# Patient Record
Sex: MALE | Race: WHITE | NOT HISPANIC OR LATINO | Employment: OTHER | URBAN - METROPOLITAN AREA
[De-identification: names, ages, dates, MRNs, and addresses within clinical notes are randomized per-mention and may not be internally consistent; named-entity substitution may affect disease eponyms.]

---

## 2023-10-07 ENCOUNTER — HOSPITAL ENCOUNTER (OUTPATIENT)
Dept: RADIOLOGY | Facility: HOSPITAL | Age: 53
Discharge: HOME | End: 2023-10-07
Payer: COMMERCIAL

## 2023-10-07 DIAGNOSIS — R29.898 OTHER SYMPTOMS AND SIGNS INVOLVING THE MUSCULOSKELETAL SYSTEM: ICD-10-CM

## 2023-10-07 DIAGNOSIS — M54.16 RADICULOPATHY, LUMBAR REGION: ICD-10-CM

## 2023-10-07 PROCEDURE — 72131 CT LUMBAR SPINE W/O DYE: CPT | Performed by: RADIOLOGY

## 2023-10-07 PROCEDURE — 72131 CT LUMBAR SPINE W/O DYE: CPT

## 2023-10-09 ENCOUNTER — TELEPHONE (OUTPATIENT)
Dept: NEUROSURGERY | Facility: CLINIC | Age: 53
End: 2023-10-09
Payer: COMMERCIAL

## 2023-11-06 PROBLEM — M51.26 HNP (HERNIATED NUCLEUS PULPOSUS), LUMBAR: Status: ACTIVE | Noted: 2023-11-06

## 2023-11-06 PROBLEM — R20.0 NUMBNESS: Status: ACTIVE | Noted: 2023-11-06

## 2023-11-06 PROBLEM — G54.1 LUMBOSACRAL PLEXUS LESION: Status: ACTIVE | Noted: 2021-07-01

## 2023-11-06 PROBLEM — R29.898 WEAKNESS OF RIGHT LOWER EXTREMITY: Status: ACTIVE | Noted: 2023-11-06

## 2023-11-06 RX ORDER — PREGABALIN 100 MG/1
100 CAPSULE ORAL 2 TIMES DAILY
COMMUNITY
Start: 2022-03-01

## 2023-11-06 RX ORDER — LANOLIN ALCOHOL/MO/W.PET/CERES
100 CREAM (GRAM) TOPICAL DAILY
COMMUNITY
Start: 2020-04-01

## 2023-11-06 RX ORDER — GABAPENTIN 100 MG/1
100 CAPSULE ORAL EVERY 8 HOURS
COMMUNITY
Start: 2023-05-10

## 2023-11-06 RX ORDER — MULTIVITAMIN
1 TABLET ORAL
COMMUNITY

## 2023-11-06 RX ORDER — FOLIC ACID 1 MG/1
1 TABLET ORAL DAILY
COMMUNITY
Start: 2020-04-01

## 2023-11-07 ENCOUNTER — OFFICE VISIT (OUTPATIENT)
Dept: NEUROSURGERY | Facility: CLINIC | Age: 53
End: 2023-11-07
Payer: COMMERCIAL

## 2023-11-07 VITALS
TEMPERATURE: 97.1 F | HEIGHT: 71 IN | HEART RATE: 77 BPM | WEIGHT: 185 LBS | SYSTOLIC BLOOD PRESSURE: 116 MMHG | BODY MASS INDEX: 25.9 KG/M2 | DIASTOLIC BLOOD PRESSURE: 76 MMHG

## 2023-11-07 DIAGNOSIS — M54.17 LUMBOSACRAL RADICULOPATHY: Primary | ICD-10-CM

## 2023-11-07 DIAGNOSIS — M47.816 LUMBAR SPONDYLOSIS: ICD-10-CM

## 2023-11-07 PROCEDURE — 99212 OFFICE O/P EST SF 10 MIN: CPT | Performed by: NEUROLOGICAL SURGERY

## 2023-11-07 RX ORDER — FLUTICASONE FUROATE 27.5 UG/1
2 SPRAY, METERED NASAL
COMMUNITY

## 2023-11-07 RX ORDER — FEXOFENADINE HCL 60 MG
30 TABLET ORAL 2 TIMES DAILY
COMMUNITY

## 2023-11-07 ASSESSMENT — PATIENT HEALTH QUESTIONNAIRE - PHQ9
SUM OF ALL RESPONSES TO PHQ9 QUESTIONS 1 & 2: 2
1. LITTLE INTEREST OR PLEASURE IN DOING THINGS: SEVERAL DAYS
2. FEELING DOWN, DEPRESSED OR HOPELESS: SEVERAL DAYS
10. IF YOU CHECKED OFF ANY PROBLEMS, HOW DIFFICULT HAVE THESE PROBLEMS MADE IT FOR YOU TO DO YOUR WORK, TAKE CARE OF THINGS AT HOME, OR GET ALONG WITH OTHER PEOPLE: VERY DIFFICULT

## 2023-11-07 ASSESSMENT — PAIN SCALES - GENERAL: PAINLEVEL: 8

## 2023-11-07 NOTE — PROGRESS NOTES
[facility] [place]  Department of Neurological Surgery  Follow-up Patient Visit    History of Present Illness:  Kyle Kohler II is a 53 y.o. year old male who presents to the spine clinic with right leg radiculopathy.     Patient reports a longstanding history of low back pain and numbness/paresthesias of the right lower extremity in an S1 distribution. Was seen by Dr. Kan initially and found to have an L5-S1 disc herniation causing lateral recess stenosis, s/p R L5-S1 microdiscectomy (2015). Symptoms resolved for 6 months then recurred, since that time has been seeing pain management for RITA with initial good response, now reduced to less than a week. Presenting to clinic today for second opinion regarding surgical correction.    Review of systems negative except as noted above.    Patient Active Problem List   Diagnosis    Alcohol abuse    Chronic rhinitis    Lumbosacral radiculopathy    Radiculopathy, lumbosacral region    Lumbosacral plexus lesion    HNP (herniated nucleus pulposus), lumbar    Numbness    Weakness of right lower extremity     History reviewed. No pertinent past medical history.  History reviewed. No pertinent surgical history.  Social History     Tobacco Use    Smoking status: Never    Smokeless tobacco: Not on file   Substance Use Topics    Alcohol use: Not Currently     family history is not on file.    Current Outpatient Medications:     ascorbic acid (Vitamin C) 100 mg tablet, Take 1 tablet (100 mg) by mouth once daily., Disp: , Rfl:     fexofenadine (Allegra) 60 mg tablet, Take 0.5 tablets (30 mg) by mouth 2 times a day., Disp: , Rfl:     fluticasone (Flonase Sensimist) 27.5 mcg/actuation nasal spray, Administer 2 sprays into each nostril once daily., Disp: , Rfl:     multivitamin tablet, Take 1 tablet by mouth once daily., Disp: , Rfl:     thiamine 100 mg tablet, Take 1 tablet (100 mg) by mouth once daily., Disp: , Rfl:     cetirizine (ZyrTEC) 10 mg capsule, Take 1  capsule (10 mg) by mouth 1 time., Disp: , Rfl:     folic acid (Folvite) 1 mg tablet, Take 1 tablet (1 mg) by mouth once daily., Disp: , Rfl:     gabapentin (Neurontin) 100 mg capsule, Take 1 capsule (100 mg) by mouth every 8 hours., Disp: , Rfl:     pregabalin (Lyrica) 100 mg capsule, Take 1 capsule (100 mg) by mouth 2 times a day., Disp: , Rfl:   No Known Allergies    Physical Examination    Awake, Ox3  BUE D/B/T/HG/IO 5  BLE HF/KE/DF/PF 5  RLE diminished sensation to light touch    Results    I personally reviewed and interpreted the imaging results which included MRI lumbar spine, demonstrating rightward L5-S1 disc herniation causing local contact with the S1 nerve root, approximately stable from MRI 2020. CT lumbar spine with right side disc-osteophyte complex at L5-S1 causing lateral recess stenosis. Flexion/extension xrays without abnormal motion.    Assessment and Plan:    Kyle Kohler II is a 53 y.o. year old male who presents to the spine clinic with chronic progressive low back pain and right S1 radiculopathy.  His symptoms have been refractory to conservative management, including physical therapy and multiple epidural steroid injections.  His imaging shows loss of disc height and a disc osteophyte complex at L5-S1 resulting in severe right lateral recess stenosis.  He also has significant bony foraminal stenosis at this level.  I discussed multiple treatment options with the patient, including further conservative care as well as open surgical approaches.  At this time, I am recommending a minimally invasive robot-assisted right L5-S1 TLIF.  I went over the risks associate with surgery, including infection, bleeding, neurologic injury, spinal fluid leak, and the need for further procedures.  All of the patient's questions were answered and he has elected to proceed with surgery.

## 2023-12-26 DIAGNOSIS — M54.17 LUMBOSACRAL RADICULOPATHY: ICD-10-CM

## 2024-03-04 ENCOUNTER — OUTSIDE PROCEDURE (OUTPATIENT)
Dept: NEUROSURGERY | Facility: HOSPITAL | Age: 54
End: 2024-03-04
Payer: COMMERCIAL

## 2024-03-04 DIAGNOSIS — M54.16 RIGHT LUMBAR RADICULOPATHY: Primary | ICD-10-CM

## 2024-03-04 PROCEDURE — 61783 SCAN PROC SPINAL: CPT | Performed by: NEUROLOGICAL SURGERY

## 2024-03-04 PROCEDURE — 22840 INSERT SPINE FIXATION DEVICE: CPT | Performed by: NEUROLOGICAL SURGERY

## 2024-03-04 PROCEDURE — 22633 ARTHRD CMBN 1NTRSPC LUMBAR: CPT | Performed by: NEUROLOGICAL SURGERY

## 2024-03-04 PROCEDURE — 22853 INSJ BIOMECHANICAL DEVICE: CPT | Performed by: NEUROLOGICAL SURGERY

## 2024-03-04 PROCEDURE — 63052 LAM FACETC/FRMT ARTHRD LUM 1: CPT | Performed by: NEUROLOGICAL SURGERY

## 2024-03-04 PROCEDURE — 20936 SP BONE AGRFT LOCAL ADD-ON: CPT | Performed by: NEUROLOGICAL SURGERY

## 2024-03-04 PROCEDURE — 20930 SP BONE ALGRFT MORSEL ADD-ON: CPT | Performed by: NEUROLOGICAL SURGERY

## 2024-03-06 ENCOUNTER — TELEPHONE (OUTPATIENT)
Dept: NEUROSURGERY | Facility: CLINIC | Age: 54
End: 2024-03-06
Payer: COMMERCIAL

## 2024-03-06 DIAGNOSIS — Z98.1 S/P LUMBAR SPINAL FUSION: Primary | ICD-10-CM

## 2024-03-06 RX ORDER — CYCLOBENZAPRINE HCL 10 MG
10 TABLET ORAL EVERY 8 HOURS PRN
Qty: 30 TABLET | Refills: 1 | Status: SHIPPED | OUTPATIENT
Start: 2024-03-06 | End: 2024-03-26 | Stop reason: SDUPTHER

## 2024-03-12 ENCOUNTER — TELEPHONE (OUTPATIENT)
Dept: NEUROSURGERY | Facility: CLINIC | Age: 54
End: 2024-03-12
Payer: COMMERCIAL

## 2024-03-12 DIAGNOSIS — Z98.1 S/P LUMBAR SPINAL FUSION: Primary | ICD-10-CM

## 2024-03-12 RX ORDER — OXYCODONE HYDROCHLORIDE 5 MG/1
5 TABLET ORAL EVERY 6 HOURS PRN
Qty: 28 TABLET | Refills: 0 | Status: SHIPPED | OUTPATIENT
Start: 2024-03-12 | End: 2024-03-13 | Stop reason: SDUPTHER

## 2024-03-13 DIAGNOSIS — G89.18 ACUTE POST-OPERATIVE PAIN: Primary | ICD-10-CM

## 2024-03-13 DIAGNOSIS — Z98.1 S/P LUMBAR SPINAL FUSION: ICD-10-CM

## 2024-03-13 RX ORDER — OXYCODONE HYDROCHLORIDE 5 MG/1
5 TABLET ORAL EVERY 6 HOURS PRN
Qty: 28 TABLET | Refills: 0 | Status: SHIPPED | OUTPATIENT
Start: 2024-03-13 | End: 2024-03-20 | Stop reason: SDUPTHER

## 2024-03-13 NOTE — PROGRESS NOTES
I have personally reviewed the OARRS report. No suspicious activity noted. This report is scanned into the electronic medical record. I have considered the risks of abuse, dependence, addiction and diversion.     Patient with recent history of major reconstructive surgery (L4 - 5 TLIF on 03/04/2024, by Dr. Lauri Mc at [facility]) necessitating narcotic medications at higher doses during the post-operative period of 6 weeks.     Will continue with close monitoring and short course refills.   Alyssa Alberto, APRN-CNP

## 2024-03-19 NOTE — PROGRESS NOTES
"Kyle Kohler II is a 53 y.o. male who is here for his 1st post op visit. He [place] L5 - S1 TL[place] [facility]. He was discharged home on 03/05/2024.    Since discharge from the hospital, he feels he's improved, but now has pain with sitting. He is tolerating pain medications. Activity level  - he is able to complete ADLs without assistance     Smoker: No  Anticoagulation: No    /62 (BP Location: Right arm, Patient Position: Sitting, BP Cuff Size: Adult)   Temp 36.4 °C (97.5 °F) (Temporal)   Ht 1.803 m (5' 11\")   Wt 83.9 kg (185 lb)   BMI 25.80 kg/m²     On exam:  A&O x 3, speech clear / fluent  Respirations even / unlabored  NAPIER  SURGICAL INCISIONS are: well approximated, no erythema / swelling / drainage  Gait is steady    Kyle Kohler II is progressing well, albeit slowly, post operatively. He agrees to follow up with Dr. Mc as previously scheduled, 05/28/2024, with lumbar spine x-rays prior to visit.  Alyssa Alberto, APRN-CNP      "

## 2024-03-20 ENCOUNTER — OFFICE VISIT (OUTPATIENT)
Dept: NEUROSURGERY | Facility: CLINIC | Age: 54
End: 2024-03-20
Payer: COMMERCIAL

## 2024-03-20 VITALS
TEMPERATURE: 97.5 F | DIASTOLIC BLOOD PRESSURE: 62 MMHG | WEIGHT: 185 LBS | BODY MASS INDEX: 25.9 KG/M2 | SYSTOLIC BLOOD PRESSURE: 110 MMHG | HEIGHT: 71 IN

## 2024-03-20 DIAGNOSIS — Z98.1 S/P LUMBAR FUSION: Primary | ICD-10-CM

## 2024-03-20 DIAGNOSIS — G89.18 ACUTE POST-OPERATIVE PAIN: ICD-10-CM

## 2024-03-20 DIAGNOSIS — Z98.1 S/P LUMBAR SPINAL FUSION: ICD-10-CM

## 2024-03-20 PROCEDURE — 99024 POSTOP FOLLOW-UP VISIT: CPT | Performed by: NURSE PRACTITIONER

## 2024-03-20 RX ORDER — OXYCODONE HYDROCHLORIDE 5 MG/1
5 TABLET ORAL EVERY 6 HOURS PRN
Qty: 28 TABLET | Refills: 0 | Status: SHIPPED | OUTPATIENT
Start: 2024-03-20 | End: 2024-03-26 | Stop reason: SDUPTHER

## 2024-03-20 ASSESSMENT — PATIENT HEALTH QUESTIONNAIRE - PHQ9
10. IF YOU CHECKED OFF ANY PROBLEMS, HOW DIFFICULT HAVE THESE PROBLEMS MADE IT FOR YOU TO DO YOUR WORK, TAKE CARE OF THINGS AT HOME, OR GET ALONG WITH OTHER PEOPLE: NOT DIFFICULT AT ALL
SUM OF ALL RESPONSES TO PHQ9 QUESTIONS 1 AND 2: 1
2. FEELING DOWN, DEPRESSED OR HOPELESS: NOT AT ALL
1. LITTLE INTEREST OR PLEASURE IN DOING THINGS: SEVERAL DAYS

## 2024-03-20 ASSESSMENT — ENCOUNTER SYMPTOMS: OCCASIONAL FEELINGS OF UNSTEADINESS: 0

## 2024-03-20 ASSESSMENT — PAIN SCALES - GENERAL: PAINLEVEL: 7

## 2024-03-26 ENCOUNTER — TELEPHONE (OUTPATIENT)
Dept: NEUROSURGERY | Facility: CLINIC | Age: 54
End: 2024-03-26
Payer: COMMERCIAL

## 2024-03-26 DIAGNOSIS — G89.18 ACUTE POST-OPERATIVE PAIN: ICD-10-CM

## 2024-03-26 DIAGNOSIS — Z98.1 S/P LUMBAR SPINAL FUSION: ICD-10-CM

## 2024-03-26 RX ORDER — OXYCODONE HYDROCHLORIDE 5 MG/1
5 TABLET ORAL EVERY 6 HOURS PRN
Qty: 28 TABLET | Refills: 0 | Status: SHIPPED | OUTPATIENT
Start: 2024-03-26 | End: 2024-04-02 | Stop reason: SDUPTHER

## 2024-03-26 RX ORDER — CYCLOBENZAPRINE HCL 10 MG
10 TABLET ORAL EVERY 8 HOURS PRN
Qty: 30 TABLET | Refills: 1 | Status: SHIPPED | OUTPATIENT
Start: 2024-03-26 | End: 2024-05-25

## 2024-03-26 NOTE — PROGRESS NOTES
I have personally reviewed the OARRS report. No suspicious activity noted. This report is scanned into the electronic medical record. I have considered the risks of abuse, dependence, addiction and diversion.     Patient with recent history of major reconstructive surgery ( L5 - S1 TLIF on 03/04/2024, at [facility] by Dr. Lauri Mc) necessitating narcotic medications at higher doses during the post-operative period of 6 weeks.     Will continue with close monitoring and short course refills.   Alyssa Alberto, APRN-CNP

## 2024-04-02 ENCOUNTER — TELEPHONE (OUTPATIENT)
Dept: NEUROSURGERY | Facility: CLINIC | Age: 54
End: 2024-04-02
Payer: COMMERCIAL

## 2024-04-02 DIAGNOSIS — G89.18 ACUTE POST-OPERATIVE PAIN: ICD-10-CM

## 2024-04-02 DIAGNOSIS — Z98.1 S/P LUMBAR SPINAL FUSION: ICD-10-CM

## 2024-04-02 RX ORDER — OXYCODONE HYDROCHLORIDE 5 MG/1
5 TABLET ORAL EVERY 6 HOURS PRN
Qty: 28 TABLET | Refills: 0 | Status: SHIPPED | OUTPATIENT
Start: 2024-04-02 | End: 2024-04-10 | Stop reason: SDUPTHER

## 2024-04-02 NOTE — PROGRESS NOTES
I have personally reviewed the OARRS report. This report is scanned into the electronic medical record. I have considered the risks of abuse, dependence, addiction and diversion.     Patient with recent history of major reconstructive surgery (L5 - S1 TLIF on [place]04[place]24, [place] [facility] [place]) necessitating narcotic medications at higher doses during the post-operative period of 6 weeks.     Will continue with close monitoring and short course refills.   Alyssa Alberto, APRN-CNP\

## 2024-04-09 ENCOUNTER — TELEPHONE (OUTPATIENT)
Dept: NEUROSURGERY | Facility: CLINIC | Age: 54
End: 2024-04-09
Payer: COMMERCIAL

## 2024-04-10 DIAGNOSIS — G89.18 ACUTE POST-OPERATIVE PAIN: ICD-10-CM

## 2024-04-10 DIAGNOSIS — Z98.1 S/P LUMBAR SPINAL FUSION: ICD-10-CM

## 2024-04-10 RX ORDER — OXYCODONE HYDROCHLORIDE 5 MG/1
5 TABLET ORAL EVERY 6 HOURS PRN
Qty: 28 TABLET | Refills: 0 | Status: SHIPPED | OUTPATIENT
Start: 2024-04-10 | End: 2024-04-17

## 2024-04-10 NOTE — PROGRESS NOTES
I have personally reviewed the OARRS report. No suspicious activity noted. This report is scanned into the electronic medical record. I have considered the risks of abuse, dependence, addiction and diversion.     Patient with recent history of major reconstructive surgery (L5 - S1 TLIF on 03/04/2024, at [facility] [place] by Dr. Lauri Mc) necessitating narcotic medications at higher doses during the post-operative period of 6 weeks.     Will continue with close monitoring. This is his final refill post operatively  Alyssa Alberto, APRN-CNP  .

## 2024-05-28 ENCOUNTER — OFFICE VISIT (OUTPATIENT)
Dept: NEUROSURGERY | Facility: CLINIC | Age: 54
End: 2024-05-28
Payer: COMMERCIAL

## 2024-05-28 VITALS
HEART RATE: 74 BPM | DIASTOLIC BLOOD PRESSURE: 70 MMHG | WEIGHT: 180 LBS | TEMPERATURE: 97.1 F | SYSTOLIC BLOOD PRESSURE: 110 MMHG | HEIGHT: 71 IN | BODY MASS INDEX: 25.2 KG/M2

## 2024-05-28 DIAGNOSIS — Z98.1 S/P LUMBAR FUSION: Primary | ICD-10-CM

## 2024-05-28 PROCEDURE — 99024 POSTOP FOLLOW-UP VISIT: CPT | Performed by: NEUROLOGICAL SURGERY

## 2024-05-28 RX ORDER — LORATADINE 10 MG/1
10 TABLET ORAL DAILY
COMMUNITY

## 2024-05-28 ASSESSMENT — PATIENT HEALTH QUESTIONNAIRE - PHQ9
SUM OF ALL RESPONSES TO PHQ9 QUESTIONS 1 & 2: 0
2. FEELING DOWN, DEPRESSED OR HOPELESS: NOT AT ALL
1. LITTLE INTEREST OR PLEASURE IN DOING THINGS: NOT AT ALL

## 2024-05-28 ASSESSMENT — PAIN SCALES - GENERAL: PAINLEVEL: 2

## 2024-05-28 NOTE — PROGRESS NOTES
[facility] [place]  Department of Neurological Surgery  Post Operative Patient Visit      History of Present Illness:  Kyle Kohler II is a 54 y.o. year old male who presents to the spine clinic for a post operative visit. They are status post L5-S1 TLIF on March 4, 2024.  He states that his preoperative right lower extremity pain has completely resolved.  He does have some tingling in his toes bilaterally, but states that this is getting better.  He also has some expected low back stiffness and discomfort, but states that this is also improving since his surgery.    14/14 systems reviewed and negative other than what is listed in the history of present illness    Patient Active Problem List   Diagnosis    Alcohol abuse    Chronic rhinitis    Lumbosacral radiculopathy    Radiculopathy, lumbosacral region    Lumbosacral plexus lesion    HNP (herniated nucleus pulposus), lumbar    Numbness    Weakness of right lower extremity     No past medical history on file.  No past surgical history on file.  Social History     Tobacco Use    Smoking status: Never    Smokeless tobacco: Not on file   Substance Use Topics    Alcohol use: Yes     Comment: Rarely     family history is not on file.    Current Outpatient Medications:     loratadine (Claritin) 10 mg tablet, Take 1 tablet (10 mg) by mouth once daily., Disp: , Rfl:     multivitamin tablet, Take 1 tablet by mouth once daily., Disp: , Rfl:     ascorbic acid (Vitamin C) 100 mg tablet, Take 1 tablet (100 mg) by mouth once daily., Disp: , Rfl:     cetirizine (ZyrTEC) 10 mg capsule, Take 1 capsule (10 mg) by mouth 1 time., Disp: , Rfl:     cyclobenzaprine (Flexeril) 10 mg tablet, Take 1 tablet (10 mg) by mouth every 8 hours if needed for muscle spasms., Disp: 30 tablet, Rfl: 1    fexofenadine (Allegra) 60 mg tablet, Take 0.5 tablets (30 mg) by mouth 2 times a day., Disp: , Rfl:     fluticasone (Flonase Sensimist) 27.5 mcg/actuation nasal spray, Administer 2  sprays into each nostril once daily., Disp: , Rfl:     folic acid (Folvite) 1 mg tablet, Take 1 tablet (1 mg) by mouth once daily., Disp: , Rfl:     gabapentin (Neurontin) 100 mg capsule, Take 1 capsule (100 mg) by mouth every 8 hours., Disp: , Rfl:     pregabalin (Lyrica) 100 mg capsule, Take 1 capsule (100 mg) by mouth 2 times a day., Disp: , Rfl:     thiamine 100 mg tablet, Take 1 tablet (100 mg) by mouth once daily., Disp: , Rfl:   No Known Allergies    Physical Examination:  General: well developed, awake/alert/oriented x3, no distress, alert and cooperative  Head/Neck: neck Supple, no apparent injury  ENMT: mucous membranes moist, no apparent injury, no lesions seen  Cardiovascular: no pitting edema, no JVD  Respiratory/Thorax: Normal breath sounds with good chest expansion, thorax symmetric  Skin: well-healed incision    Neurological/Musculoskeletal:    - Posture: normal coronal & sagittal alignment    - Range of motion: full active & passive range of motion    - Muscle Bulk: normal and symmetric in all extremities    - Paraspinal muscle spasm/tenderness absent    - Motor Strength: 5/5 throughout all extremities    - Sensation: intact to light touch      Results  I personally reviewed and interpreted the imaging results, which included x-rays of the lumbar spine performed on March 5, 2024.  These show well-positioned instrumentation.    Assessment/Plan   Kyle Kohler II is a 54 y.o. year old male who presents to the spine clinic for a post operative visit. They are status post L5-S1 TLIF on March 4, 2024.  He states that his preoperative right lower extremity pain has completely resolved.  He does have some tingling in his toes bilaterally, but states that this is getting better.  He also has some expected low back stiffness and discomfort, but states that this is also improving since his surgery.  I told patient that I think that he is progressing nicely.  I am going to refer him to physical therapy to work  on his range of motion.  I am also ordering new x-rays to be completed today.  I advised him to slowly advance his activity level as tolerated.  He will follow-up on an as-needed basis.      The above clinical summary has been dictated with voice recognition software. It has not been proofread for grammatical errors, typographical mistakes, or other semantic inconsistencies.    Thank you for visiting our office today. It was our pleasure to take part in your healthcare.     Do not hesitate to call with any questions regarding your plan of care after leaving at [phone] M-F 8am-4pm.     To clinicians, thank you very much for this kind referral. It is a privilege to partner with you in the care of your patients. My office would be delighted to assist you with any further consultations or with questions regarding the plan of care outlined. Do not hesitate to call the office or contact me directly.       Sincerely,      Lauri Mc MD PhD  Attending Neurosurgeon  [facility]   of Neurological Surgery  [facility]    [address]  [place] [address]    [facility]  [address] [place] [address]  [id number]  [place] [address]    Office: [phone]  Fax: [phone]